# Patient Record
Sex: FEMALE | Race: WHITE | Employment: OTHER | URBAN - METROPOLITAN AREA
[De-identification: names, ages, dates, MRNs, and addresses within clinical notes are randomized per-mention and may not be internally consistent; named-entity substitution may affect disease eponyms.]

---

## 2023-07-10 ENCOUNTER — OFFICE VISIT (OUTPATIENT)
Dept: OBGYN CLINIC | Age: 58
End: 2023-07-10
Payer: COMMERCIAL

## 2023-07-10 VITALS
SYSTOLIC BLOOD PRESSURE: 120 MMHG | BODY MASS INDEX: 27.05 KG/M2 | HEIGHT: 62 IN | WEIGHT: 147 LBS | DIASTOLIC BLOOD PRESSURE: 80 MMHG

## 2023-07-10 DIAGNOSIS — Z12.31 ENCOUNTER FOR SCREENING MAMMOGRAM FOR MALIGNANT NEOPLASM OF BREAST: ICD-10-CM

## 2023-07-10 DIAGNOSIS — Z01.419 WELL WOMAN EXAM: Primary | ICD-10-CM

## 2023-07-10 DIAGNOSIS — Z13.89 SCREENING FOR GENITOURINARY CONDITION: ICD-10-CM

## 2023-07-10 DIAGNOSIS — Z11.51 SPECIAL SCREENING EXAMINATION FOR HUMAN PAPILLOMAVIRUS (HPV): ICD-10-CM

## 2023-07-10 LAB
BILIRUBIN, URINE, POC: NEGATIVE
BLOOD URINE, POC: NORMAL
GLUCOSE URINE, POC: NEGATIVE
KETONES, URINE, POC: NEGATIVE
LEUKOCYTE ESTERASE, URINE, POC: NEGATIVE
NITRITE, URINE, POC: NEGATIVE
PH, URINE, POC: 5 (ref 4.6–8)
PROTEIN,URINE, POC: NEGATIVE
SPECIFIC GRAVITY, URINE, POC: 1.01 (ref 1–1.03)
URINALYSIS CLARITY, POC: CLEAR
URINALYSIS COLOR, POC: YELLOW
UROBILINOGEN, POC: NORMAL

## 2023-07-10 PROCEDURE — 81002 URINALYSIS NONAUTO W/O SCOPE: CPT | Performed by: OBSTETRICS & GYNECOLOGY

## 2023-07-10 PROCEDURE — 99386 PREV VISIT NEW AGE 40-64: CPT | Performed by: OBSTETRICS & GYNECOLOGY

## 2023-07-10 RX ORDER — ESTRADIOL 0.1 MG/G
2 CREAM VAGINAL
Qty: 42.5 G | Refills: 3 | Status: SHIPPED | OUTPATIENT
Start: 2023-07-10 | End: 2023-07-24

## 2023-07-10 ASSESSMENT — PATIENT HEALTH QUESTIONNAIRE - PHQ9
SUM OF ALL RESPONSES TO PHQ QUESTIONS 1-9: 0
2. FEELING DOWN, DEPRESSED OR HOPELESS: 0
SUM OF ALL RESPONSES TO PHQ9 QUESTIONS 1 & 2: 0
SUM OF ALL RESPONSES TO PHQ QUESTIONS 1-9: 0
1. LITTLE INTEREST OR PLEASURE IN DOING THINGS: 0

## 2023-07-19 LAB
CYTOLOGIST CVX/VAG CYTO: ABNORMAL
CYTOLOGY CVX/VAG DOC THIN PREP: ABNORMAL
HPV APTIMA: POSITIVE
HPV GENOTYPE 18,45: POSITIVE
HPV, GENOTYPE 16: NEGATIVE
Lab: ABNORMAL
PATH REPORT.FINAL DX SPEC: ABNORMAL
PATHOLOGIST CVX/VAG CYTO: ABNORMAL
PATHOLOGIST PROVIDED ICD: ABNORMAL
RECOM F/U CVX/VAG CYTO: ABNORMAL
STAT OF ADQ CVX/VAG CYTO-IMP: ABNORMAL

## 2023-07-21 ENCOUNTER — TELEPHONE (OUTPATIENT)
Dept: OBGYN CLINIC | Age: 58
End: 2023-07-21

## 2023-07-25 ENCOUNTER — TELEPHONE (OUTPATIENT)
Dept: OBGYN CLINIC | Age: 58
End: 2023-07-25

## 2023-07-25 NOTE — TELEPHONE ENCOUNTER
Handled under another encounter. Encounter closed.
Pt called after seeing pap smear results on [facility]. She is aware that results came in while Dr. Christoper Habermann is out of the office and he has not reviewed these at this time, however, he is back in the office on Monday. She is aware we will contact her once we have his recommendations. She voiced full understanding and is aware. All questions answered and pt advised to call back PRN.
done

## 2023-07-25 NOTE — TELEPHONE ENCOUNTER
----- Message from Carlos Jaime RN sent at 7/24/2023 12:57 PM EDT -----    ----- Message -----  From: Lou Almazan MD  Sent: 7/24/2023  10:10 AM EDT  To: Carlos Jaime RN    She needs colposcopy in the next month or so.   Pearle Bence

## 2023-07-25 NOTE — TELEPHONE ENCOUNTER
Spoke with pt, she is aware of results and recommendations for colposcopy. Scheduled for 8/15/23 at 8:30 am. All questions answered and pt advised to call back PRN.

## 2023-08-15 ENCOUNTER — PROCEDURE VISIT (OUTPATIENT)
Dept: OBGYN CLINIC | Age: 58
End: 2023-08-15
Payer: COMMERCIAL

## 2023-08-15 VITALS
HEIGHT: 62 IN | SYSTOLIC BLOOD PRESSURE: 104 MMHG | WEIGHT: 148.8 LBS | DIASTOLIC BLOOD PRESSURE: 62 MMHG | BODY MASS INDEX: 27.38 KG/M2

## 2023-08-15 DIAGNOSIS — R87.611 ATYPICAL SQUAMOUS CELLS CANNOT EXCLUDE HIGH GRADE SQUAMOUS INTRAEPITHELIAL LESION ON CYTOLOGIC SMEAR OF CERVIX (ASC-H): Primary | ICD-10-CM

## 2023-08-15 DIAGNOSIS — R87.618 PAP SMEAR ABNORMALITY OF CERVIX/HUMAN PAPILLOMAVIRUS (HPV) POSITIVE: ICD-10-CM

## 2023-08-15 PROCEDURE — 57454 BX/CURETT OF CERVIX W/SCOPE: CPT | Performed by: OBSTETRICS & GYNECOLOGY

## 2023-08-15 PROCEDURE — 99024 POSTOP FOLLOW-UP VISIT: CPT | Performed by: OBSTETRICS & GYNECOLOGY

## 2023-08-15 ASSESSMENT — PATIENT HEALTH QUESTIONNAIRE - PHQ9
2. FEELING DOWN, DEPRESSED OR HOPELESS: 0
SUM OF ALL RESPONSES TO PHQ QUESTIONS 1-9: 0
SUM OF ALL RESPONSES TO PHQ9 QUESTIONS 1 & 2: 0
SUM OF ALL RESPONSES TO PHQ QUESTIONS 1-9: 0
1. LITTLE INTEREST OR PLEASURE IN DOING THINGS: 0

## 2023-08-15 NOTE — PROGRESS NOTES
Colposcopy Exam          Indication for Colposcopy:  ASC-H, +HPV subtypes. Contraceptive method:  post menopausal status    LMP:  No LMP recorded. Patient is postmenopausal.  Tobacco Use:    Social History     Tobacco Use   Smoking Status Never   Smokeless Tobacco Never     Medications:    Current Outpatient Medications on File Prior to Visit   Medication Sig Dispense Refill    Multiple Vitamin (MULTIVITAMIN PO) Take by mouth      estradiol (ESTRACE VAGINAL) 0.1 MG/GM vaginal cream Place 2 g vaginally nightly for 14 days 42.5 g 3     No current facility-administered medications on file prior to visit. Allergies: Allergies as of 08/15/2023 - Fully Reviewed 08/15/2023   Allergen Reaction Noted    Pcn [penicillins] Swelling 07/10/2023       uHCG:  not tested    Abnormal Pap and Colposcopy History:  never had abnormal pap. No recent changes in social situation. HPV testing:  Positive. Procedure: The patient is counseled regarding the risks of coloposcopy, including bleeding, infection at the biopsy site or endometrium, and failure to identify the lesion. Patient was placed in the lithotomy position. The vulva was examined for suspicious lesions. Subsequently a speculum was inserted and the cervix and vaginal wall were visualized. 3% Acetic acid was applied to the cervix using cotton swabs and the area was further visualized. Abnormal areas were not identified in the upper vagina or on the surface of the cervix and biopsies were not obtained. Global atrophy was generally observed but no acetowhite change. Adequate Procedure: YES    ECC Performed YES    Additional lesion identified: no     Summary:  normal exam without visible pathology and atrophy which she has not yet treated. Plan:  Recommended proceeding with vaginal estrogen cream.  Follow up on histology as indicated.

## 2023-08-22 ENCOUNTER — TELEPHONE (OUTPATIENT)
Dept: OBGYN CLINIC | Age: 58
End: 2023-08-22

## 2023-08-22 NOTE — TELEPHONE ENCOUNTER
----- Message from Chiqui Winters MD sent at 8/18/2023 12:16 PM EDT -----  OK to repeat pap in 6 mos. Alternative option is to treat at this point,  LEEP would be procedure of choice.   Indu Gonzalez

## 2023-08-22 NOTE — TELEPHONE ENCOUNTER
Spoke with pt, she is aware of results and treatment options. At this time she would prefer to repeat pap smear in 6 months. She is aware that we do not have our schedule out that far at this time, however, I will send her a mychart message to remind her to schedule her appointment. She voiced full understanding and is aware. All questions answered and pt advised to call back PRN. Delayed mychart message sent to patient.

## 2024-01-22 ENCOUNTER — TELEPHONE (OUTPATIENT)
Dept: OBGYN CLINIC | Age: 59
End: 2024-01-22

## 2024-01-22 NOTE — TELEPHONE ENCOUNTER
Called patient to remind her it is time to schedule 6 month follow up pap smear with Dr. Pichardo. She states that they are in the process of moving and she will have to wait to reschedule because she provides  for her grandchild. She states that she will call back to reschedule. All questions answered.